# Patient Record
Sex: FEMALE | URBAN - METROPOLITAN AREA
[De-identification: names, ages, dates, MRNs, and addresses within clinical notes are randomized per-mention and may not be internally consistent; named-entity substitution may affect disease eponyms.]

---

## 2024-07-30 ENCOUNTER — OFFICE VISIT (OUTPATIENT)
Dept: URBAN - METROPOLITAN AREA CLINIC 115 | Facility: CLINIC | Age: 39
End: 2024-07-30
Payer: COMMERCIAL

## 2024-07-30 ENCOUNTER — DASHBOARD ENCOUNTERS (OUTPATIENT)
Age: 39
End: 2024-07-30

## 2024-07-30 VITALS
DIASTOLIC BLOOD PRESSURE: 89 MMHG | HEIGHT: 61 IN | BODY MASS INDEX: 35.57 KG/M2 | TEMPERATURE: 98 F | SYSTOLIC BLOOD PRESSURE: 129 MMHG | WEIGHT: 188.4 LBS | HEART RATE: 77 BPM

## 2024-07-30 DIAGNOSIS — R19.7 ACUTE DIARRHEA: ICD-10-CM

## 2024-07-30 DIAGNOSIS — R10.13 EPIGASTRIC PAIN: ICD-10-CM

## 2024-07-30 PROCEDURE — 99244 OFF/OP CNSLTJ NEW/EST MOD 40: CPT

## 2024-07-30 RX ORDER — OMEPRAZOLE 20 MG/1
1 TABLET 30 MINUTES BEFORE MORNING MEAL TABLET, DELAYED RELEASE ORAL ONCE A DAY
Status: ACTIVE | COMMUNITY

## 2024-07-30 RX ORDER — OMEPRAZOLE 40 MG/1
1 CAPSULE 30 MINUTES BEFORE MORNING MEAL CAPSULE, DELAYED RELEASE ORAL ONCE A DAY
Qty: 30 | Refills: 1 | OUTPATIENT
Start: 2024-07-30

## 2024-07-30 RX ORDER — ATORVASTATIN CALCIUM 10 MG/1
1 TABLET TABLET, FILM COATED ORAL ONCE A DAY
Status: ACTIVE | COMMUNITY

## 2024-07-30 NOTE — PHYSICAL EXAM GASTROINTESTINAL
Abdomen , soft, low abdominal tenderness and epigastric tenderness, nondistended , no guarding or rigidity , no masses palpable

## 2024-07-30 NOTE — HPI-TODAY'S VISIT:
37 y/o Indonesian-speaking F with PMH of HLD presents on referral from Dr. Christiano Kumar with c/o diarrhea and abdominal pain. A copy of this note will be sent to referring provider.  States her low abdominal pain radiating upwards lasted 6 days, then developed diarrhea for the past 2 days. Denies recent travel. States her PCP gave her an antidiarrheal which has helped. Diarrhea occurs 10-15x a day, type 7, complete evaucation; with the medication, she only had 1-3 BMs but it also decreased her appetite. Reports reflux that is controlled with omeprazole 20mg. Notes that when she does eat, it feels like it sits in her stomach and reports bloating. Denies n/v, constipation, blood in stool, dysphagia, odynophagia, unintentional weight loss, change in appetite, early satiety. Denies NSAID use. Denies EtOH/tobacco use.  Denies PMH of MI, stroke, seizures, BiPAP use, pacemaker/defibrillator, blood thinners, ESRD.

## 2024-07-31 ENCOUNTER — ERX REFILL RESPONSE (OUTPATIENT)
Dept: URBAN - METROPOLITAN AREA CLINIC 115 | Facility: CLINIC | Age: 39
End: 2024-07-31

## 2024-07-31 LAB
IMMUNOGLOBULIN A: 220
INTERPRETATION: (no result)
TISSUE TRANSGLUTAMINASE AB, IGA: <1
TSH: 2.58

## 2024-07-31 RX ORDER — OMEPRAZOLE 40 MG/1
TAKE 1 CAPSULE BY MOUTH DAILY 30 MINUTES BEFORE BREAKFAST CAPSULE, DELAYED RELEASE ORAL
Qty: 90 CAPSULE | Refills: 0 | OUTPATIENT

## 2024-07-31 RX ORDER — OMEPRAZOLE 40 MG/1
1 CAPSULE 30 MINUTES BEFORE MORNING MEAL CAPSULE, DELAYED RELEASE ORAL ONCE A DAY
Qty: 30 | Refills: 1 | OUTPATIENT

## 2024-08-28 ENCOUNTER — OFFICE VISIT (OUTPATIENT)
Dept: URBAN - METROPOLITAN AREA SURGERY CENTER 13 | Facility: SURGERY CENTER | Age: 39
End: 2024-08-28

## 2024-08-28 RX ORDER — ATORVASTATIN CALCIUM 10 MG/1
1 TABLET TABLET, FILM COATED ORAL ONCE A DAY
Status: ACTIVE | COMMUNITY

## 2024-08-28 RX ORDER — OMEPRAZOLE 40 MG/1
TAKE 1 CAPSULE BY MOUTH DAILY 30 MINUTES BEFORE BREAKFAST CAPSULE, DELAYED RELEASE ORAL
Qty: 90 CAPSULE | Refills: 0 | Status: ACTIVE | COMMUNITY

## 2024-08-28 RX ORDER — OMEPRAZOLE 20 MG/1
1 TABLET 30 MINUTES BEFORE MORNING MEAL TABLET, DELAYED RELEASE ORAL ONCE A DAY
Status: ACTIVE | COMMUNITY

## 2024-09-03 ENCOUNTER — OFFICE VISIT (OUTPATIENT)
Dept: URBAN - METROPOLITAN AREA CLINIC 115 | Facility: CLINIC | Age: 39
End: 2024-09-03

## 2024-09-03 RX ORDER — ATORVASTATIN CALCIUM 10 MG/1
1 TABLET TABLET, FILM COATED ORAL ONCE A DAY
Status: ACTIVE | COMMUNITY

## 2024-09-03 RX ORDER — OMEPRAZOLE 20 MG/1
1 TABLET 30 MINUTES BEFORE MORNING MEAL TABLET, DELAYED RELEASE ORAL ONCE A DAY
Status: ACTIVE | COMMUNITY

## 2024-09-03 RX ORDER — OMEPRAZOLE 40 MG/1
TAKE 1 CAPSULE BY MOUTH DAILY 30 MINUTES BEFORE BREAKFAST CAPSULE, DELAYED RELEASE ORAL
Qty: 90 CAPSULE | Refills: 0 | Status: ACTIVE | COMMUNITY

## 2024-09-03 NOTE — HPI-TODAY'S VISIT:
37 y/o F with PMH of HLD presents on referral form Dr. Stacy Alston for abdominal pain. A copy of this note will be sent to referring provider.  Denies n/v, diarrhea, constipation, blood in stool/vomit, dysphagia, odynophagia, unintentional weight loss, change in appetite, early satiety. Denies NSAID use, EtOH/tobacco/marijuana use.  Last colonoscopy: